# Patient Record
Sex: FEMALE | Race: WHITE | NOT HISPANIC OR LATINO | Employment: FULL TIME | URBAN - METROPOLITAN AREA
[De-identification: names, ages, dates, MRNs, and addresses within clinical notes are randomized per-mention and may not be internally consistent; named-entity substitution may affect disease eponyms.]

---

## 2018-01-30 ENCOUNTER — OFFICE VISIT (OUTPATIENT)
Dept: OBGYN CLINIC | Facility: CLINIC | Age: 53
End: 2018-01-30
Payer: COMMERCIAL

## 2018-01-30 VITALS
HEIGHT: 60 IN | BODY MASS INDEX: 20.22 KG/M2 | DIASTOLIC BLOOD PRESSURE: 62 MMHG | SYSTOLIC BLOOD PRESSURE: 114 MMHG | WEIGHT: 103 LBS

## 2018-01-30 DIAGNOSIS — Z12.31 ENCOUNTER FOR SCREENING MAMMOGRAM FOR MALIGNANT NEOPLASM OF BREAST: ICD-10-CM

## 2018-01-30 DIAGNOSIS — Z01.419 ENCOUNTER FOR GYNECOLOGICAL EXAMINATION WITHOUT ABNORMAL FINDING: ICD-10-CM

## 2018-01-30 DIAGNOSIS — Z12.11 COLON CANCER SCREENING: ICD-10-CM

## 2018-01-30 DIAGNOSIS — Z11.51 SCREENING FOR HPV (HUMAN PAPILLOMAVIRUS): Primary | ICD-10-CM

## 2018-01-30 PROCEDURE — 87624 HPV HI-RISK TYP POOLED RSLT: CPT | Performed by: OBSTETRICS & GYNECOLOGY

## 2018-01-30 PROCEDURE — G0145 SCR C/V CYTO,THINLAYER,RESCR: HCPCS | Performed by: OBSTETRICS & GYNECOLOGY

## 2018-01-30 PROCEDURE — 99386 PREV VISIT NEW AGE 40-64: CPT | Performed by: OBSTETRICS & GYNECOLOGY

## 2018-01-30 NOTE — PROGRESS NOTES
Assessment/Plan:    No problem-specific Assessment & Plan notes found for this encounter  Diagnoses and all orders for this visit:    Screening for HPV (human papillomavirus)  -     Liquid-based pap, screening    Encounter for screening mammogram for malignant neoplasm of breast  -     Mammo screening bilateral w cad    Encounter for gynecological examination without abnormal finding    Colon cancer screening  -     Ambulatory referral to Gastroenterology; Future        Annual examination was completed  Pap with HPV testing was obtained  Mammogram was ordered  We discussed her right lower quadrant discomfort and occasional bulge  This appears consistent with a inguinal hernia  Patient advised to call me if she has persistent pain or mass  We did discuss the use of supportive measures while at work with her lifting  We also discussed potential need for surgical consultation  Patient will continue to observe her night sweats that are improving she will call if she does not continue to see gradual resolution  Subjective:      Patient ID: Yaima Marinelli is a 46 y o  female  Patient presents as a new patient for annual gynecologic visit  She has been menopausal for 2 years  She has no postmenopausal bleeding  She does complain of some night sweats that have decreased in severity  Her medical history is noncontributory  She does complain of occasional bulge and discomfort in her right lower quadrant of her abdomen  She does work as a  at [employer]  Patient does have a family history significant for 2 brothers with colon cancer 1 of which is continuing to have chemotherapeutic treatment  Patient had colonoscopy 2 years ago and was reportedly normal and is on a 5 year routine          The following portions of the patient's history were reviewed and updated as appropriate: allergies, current medications, past family history, past medical history, past social history, past surgical history and problem list     Review of Systems   Constitutional: Negative  Night sweats   HENT: Negative  Eyes: Negative  Respiratory: Negative  Cardiovascular: Negative  Gastrointestinal: Negative  Occasional RLQ bulge w/ discomfort   Endocrine: Negative  Genitourinary: Negative for menstrual problem (normal flow and duration), pelvic pain, vaginal discharge and vaginal pain  Musculoskeletal: Negative  Skin: Negative  Allergic/Immunologic: Negative  Neurological: Negative  Psychiatric/Behavioral: Negative  Objective:     Physical Exam   Constitutional: She is oriented to person, place, and time  She appears well-developed and well-nourished  HENT:   Head: Normocephalic and atraumatic  Nose: Nose normal    Eyes: EOM are normal  Pupils are equal, round, and reactive to light  Neck: Normal range of motion  Neck supple  No thyromegaly present  Cardiovascular: Normal rate and regular rhythm  Pulmonary/Chest: Effort normal and breath sounds normal  No respiratory distress  Breasts no masses, tenderness, skin changes   Abdominal: Soft  Bowel sounds are normal  She exhibits no distension and no mass  There is no tenderness  Hernia confirmed negative in the right inguinal area and confirmed negative in the left inguinal area  No evidence of hernia   Genitourinary: Vagina normal and uterus normal  No breast swelling, tenderness, discharge or bleeding  Pelvic exam was performed with patient supine  No labial fusion  There is no rash, tenderness, lesion or injury on the right labia  There is no rash, tenderness, lesion or injury on the left labia  Cervix exhibits no motion tenderness, no discharge and no friability  Genitourinary Comments: Ext genitalia nl female w/o lesions  Cervix healthy w/o lesions or discharge  uterus nl size, NT, no mass  Adnexa NT, no mass  Rectal no masses   Musculoskeletal: Normal range of motion  She exhibits no edema  Lymphadenopathy:        Right: No inguinal adenopathy present  Left: No inguinal adenopathy present  Neurological: She is alert and oriented to person, place, and time  She has normal reflexes  Skin: Skin is warm and dry  No rash noted  Psychiatric: She has a normal mood and affect  Her behavior is normal  Thought content normal    Nursing note and vitals reviewed

## 2018-02-02 LAB
HPV RRNA GENITAL QL NAA+PROBE: NORMAL
LAB AP GYN PRIMARY INTERPRETATION: NORMAL
Lab: NORMAL

## 2018-02-07 ENCOUNTER — TELEPHONE (OUTPATIENT)
Dept: OBGYN CLINIC | Facility: MEDICAL CENTER | Age: 53
End: 2018-02-07

## 2018-02-07 NOTE — TELEPHONE ENCOUNTER
----- Message from Pastor Kyaw DO sent at 2/2/2018  3:03 PM EST -----  Results within normal limits  Inform pt PAP wnl

## 2019-03-15 ENCOUNTER — TRANSCRIBE ORDERS (OUTPATIENT)
Dept: ADMINISTRATIVE | Facility: HOSPITAL | Age: 54
End: 2019-03-15

## 2019-03-15 ENCOUNTER — HOSPITAL ENCOUNTER (OUTPATIENT)
Dept: RADIOLOGY | Facility: HOSPITAL | Age: 54
Discharge: HOME | End: 2019-03-15
Attending: PODIATRIST
Payer: COMMERCIAL

## 2019-03-15 ENCOUNTER — APPOINTMENT (OUTPATIENT)
Dept: LAB | Facility: HOSPITAL | Age: 54
End: 2019-03-15
Attending: PODIATRIST
Payer: COMMERCIAL

## 2019-03-15 DIAGNOSIS — L60.3 MEDIAN CANALIFORM NAIL DYSTROPHY: ICD-10-CM

## 2019-03-15 DIAGNOSIS — M79.674 PAIN IN TOE OF RIGHT FOOT: Primary | ICD-10-CM

## 2019-03-15 DIAGNOSIS — M79.674 PAIN IN TOE OF RIGHT FOOT: ICD-10-CM

## 2019-03-15 LAB
ALBUMIN SERPL BCP-MCNC: 4.6 G/DL (ref 3–5.2)
ALP SERPL-CCNC: 98 U/L (ref 43–122)
ALT SERPL W P-5'-P-CCNC: 14 U/L (ref 9–52)
AST SERPL W P-5'-P-CCNC: 31 U/L (ref 14–36)
BILIRUB DIRECT SERPL-MCNC: 0 MG/DL
BILIRUB SERPL-MCNC: 0.3 MG/DL
ERYTHROCYTE [DISTWIDTH] IN BLOOD BY AUTOMATED COUNT: 13.3 %
HCT VFR BLD AUTO: 40.8 % (ref 36–46)
HGB BLD-MCNC: 13.4 G/DL (ref 12–16)
MCH RBC QN AUTO: 30.3 PG (ref 26–34)
MCHC RBC AUTO-ENTMCNC: 33 G/DL (ref 31–36)
MCV RBC AUTO: 92 FL (ref 80–100)
PLATELET # BLD AUTO: 265 THOUSANDS/UL (ref 150–450)
PMV BLD AUTO: 8.8 FL (ref 8.9–12.7)
PROT SERPL-MCNC: 7.5 G/DL (ref 5.9–8.4)
RBC # BLD AUTO: 4.44 MILLION/UL (ref 4–5.2)
WBC # BLD AUTO: 6.9 THOUSAND/UL (ref 4.5–11)

## 2019-03-15 PROCEDURE — 85027 COMPLETE CBC AUTOMATED: CPT

## 2019-03-15 PROCEDURE — 80076 HEPATIC FUNCTION PANEL: CPT

## 2019-03-15 PROCEDURE — 36415 COLL VENOUS BLD VENIPUNCTURE: CPT

## 2019-03-15 PROCEDURE — 73630 X-RAY EXAM OF FOOT: CPT

## 2022-07-25 ENCOUNTER — EVALUATION (OUTPATIENT)
Dept: PHYSICAL THERAPY | Facility: CLINIC | Age: 57
End: 2022-07-25
Payer: COMMERCIAL

## 2022-07-25 VITALS — DIASTOLIC BLOOD PRESSURE: 80 MMHG | SYSTOLIC BLOOD PRESSURE: 112 MMHG

## 2022-07-25 DIAGNOSIS — M54.50 ACUTE LEFT-SIDED LOW BACK PAIN WITHOUT SCIATICA: Primary | ICD-10-CM

## 2022-07-25 PROCEDURE — 97161 PT EVAL LOW COMPLEX 20 MIN: CPT

## 2022-07-25 PROCEDURE — 97110 THERAPEUTIC EXERCISES: CPT

## 2022-07-25 NOTE — PROGRESS NOTES
PT Evaluation     Today's date: 2022  Patient name: Batsheva Alaniz  : 1965  MRN: [id number]  Referring provider: Brian Leonardo  Dx:   Encounter Diagnosis     ICD-10-CM    1  Acute left-sided low back pain without sciatica  M54 50        Start Time: 846  Stop Time: 930  Total time in clinic (min): 44 minutes    Assessment  Assessment details: Pt is 60 yo female presenting to OP PT secondary to diagnosis of Low back pain with onset 1 month ago  Pt denies hx of trauma  Pt reports functional limitations as follows discomfort with pushing/pulling activities, pain elevated when waking up in the morning, and intermittently at the end of her work day  Patient demonstrates lumbar AROM deficits, LE strength deficits, and postural deficits in standing/sitting  Pt presents with signs and symptoms consistent with classification of acute low back pain with mobility deficits  She has discomfort with positions promoting back extension, flexion-based program initially progress to extension based program once sx decrease  Pt would benefit from skilled PT to address aforementioned deficits and improve return to prior level of function  Impairments: abnormal or restricted ROM, activity intolerance and pain with function    Symptom irritability: moderateUnderstanding of Dx/Px/POC: good  Goals  Pt will demonstrate [place] with progressive home exercise program to improve carryover and decrease recurrence of symptoms    Pt will demonstrate 20% improvement in FOTO score to increase tolerance to functional activities   Pt will demonstrate 10-20 deg improvement in lumbar AROM to increase tolerance to reaching for items off the floor and improve ease with turning  Pt will demonstrate tolerance to pushing/pulling medium weights (up to 15Ibs) with min to no discomfort to improve ease with return to work duties  Pt will demonstrate tolerance to maintain proper posture (decrease in forward flexion) when amb/standing without sx elevation to improve ease with  Standing/walking during work up to 8 hours  Pt will demonstrate 5/5 in LE strength to allow for improved tolerance to prolonged amb/standing      Plan  Patient would benefit from: PT eval and skilled physical therapy  Planned modality interventions: TENS, low level laser therapy, cryotherapy, ultrasound, traction and thermotherapy: hydrocollator packs  Planned therapy interventions: manual therapy, joint mobilization, neuromuscular re-education, patient education, therapeutic activities, therapeutic exercise, home exercise program, Calle taping, balance/weight bearing training, gait training, balance and stretching  Frequency: 2x week  Duration in visits: 12  Duration in weeks: 6  Plan of Care beginning date: 2022  Plan of Care expiration date: 2022  Treatment plan discussed with: patient        Subjective Evaluation    History of Present Illness  Mechanism of injury: Autumn Cain is a 62 yo female presenting to OP PT secondary to left-sided low back pain with insidious onset 1 month ago  She reports pain is primarily aggravated in the morning and lasts throughout the day  She reports she manages symptoms with pain medication, primarily ibuprofen  Pt reports increase in pain with pulling or pushing movements  She denies numbness/tingling or hx of trauma  Pt reports she has had few episodes of pain in the past week while on vacation  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             Recurrent probem    Quality of life: excellent    Pain  Current pain ratin  At best pain ratin  At worst pain rating: 10  Location: Left side of low back  Quality: dull ache and throbbing  Relieving factors: rest and medications  Aggravating factors: standing and walking  Progression: no change    Social Support  Steps to enter house: no  Stairs in house: no   Lives in: [facility]  Lives with: significant other    Employment status: working  Hand dominance: left  Exercise history: Sedentary   Life stress: normal      Diagnostic Tests  X-ray: normal  Treatments  Previous treatment: medication  Current treatment: medication  Patient Goals  Patient goals for therapy: decreased pain and increased strength          Objective     Concurrent Complaints  Negative for night pain, disturbed sleep, bladder dysfunction, bowel dysfunction and saddle (S4) numbness    Active Range of Motion     Lumbar   Flexion:  WFL  Extension: 10 degrees  with pain  Left lateral flexion: 10 degrees    with pain  Right lateral flexion: 15 degrees  with pain  Left rotation:  Restriction level: minimal  Right rotation:  Restriction level: minimal    Strength/Myotome Testing     Lumbar   Left   Heel walk: normal  Toe walk: normal    Right   Heel walk: normal  Toe walk: normal    Left Hip   Planes of Motion   Flexion: 4  External rotation: 4  Internal rotation: 4    Right Hip   Planes of Motion   Flexion: 4  External rotation: 4  Internal rotation: 4    Left Knee   Flexion: 4  Extension: 4+    Right Knee   Flexion: 4  Extension: 4+    Left Ankle/Foot   Dorsiflexion: 4  Plantar flexion: 4+  Great toe extension: 4    Right Ankle/Foot   Dorsiflexion: 4  Plantar flexion: 4+  Great toe extension: 4    Tests     Lumbar     Left   Negative crossed SLR, passive SLR and slump test      Right   Negative crossed SLR, passive SLR and slump test      General Comments:    Lower quarter screen   Hips: unremarkable  Knees: unremarkable  Foot/ankle: unremarkable      Flowsheet Rows    Flowsheet Row Most Recent Value   PT/OT G-Codes    Current Score 44   Projected Score 68             Precautions: none      Manuals                                                                 Neuro Re-Ed                                                                                                        Ther Ex             LTR 10x5''            SKC 10x5''             TA holds, with [employer] [place] sets Bridges 5''x20            Mini squats             Standing SLR             Bridges             DKC with Pball             Ther Activity             Nustep             Give HEP NV            Gait Training                                       Modalities

## 2022-07-25 NOTE — LETTER
2022    Nikole Turner  [address]  [place] [address] [facility]    Patient: Reji Montoya   YOB: 1965   Date of Visit: 2022     Encounter Diagnosis     ICD-10-CM    1  Acute left-sided low back pain without sciatica  M54 50        Dear Dr Geovanni Madden:    Thank you for your recent referral of Reji Montoya  Please review the attached evaluation summary from Bianka's recent visit  Please verify that you agree with the plan of care by signing the attached order  If you have any questions or concerns, please do not hesitate to call  I sincerely appreciate the opportunity to share in the care of one of your patients and hope to have another opportunity to work with you in the near future  Sincerely,    Roger Boswell, PT      Referring Provider:      I certify that I have read the below Plan of Care and certify the need for these services furnished under this plan of treatment while under my care  [address]  Via Fax: [phone]          PT Evaluation     Today's date: 2022  Patient name: Reji Montoya  : 1965  MRN: [id number]  Referring provider: Cassandra Simpson  Dx:   Encounter Diagnosis     ICD-10-CM    1  Acute left-sided low back pain without sciatica  M54 50        Start Time: 08  Stop Time: 930  Total time in clinic (min): 44 minutes    Assessment  Assessment details: Pt is 60 yo female presenting to OP PT secondary to diagnosis of Low back pain with onset 1 month ago  Pt denies hx of trauma  Pt reports functional limitations as follows discomfort with pushing/pulling activities, pain elevated when waking up in the morning, and intermittently at the end of her work day  Patient demonstrates lumbar AROM deficits, LE strength deficits, and postural deficits in standing/sitting  Pt presents with signs and symptoms consistent with classification of acute low back pain with mobility deficits   She has discomfort with positions promoting back extension, flexion-based program initially progress to extension based program once sx decrease  Pt would benefit from skilled PT to address aforementioned deficits and improve return to prior level of function  Impairments: abnormal or restricted ROM, activity intolerance and pain with function    Symptom irritability: moderateUnderstanding of Dx/Px/POC: good  Goals  Pt will demonstrate [place] with progressive home exercise program to improve carryover and decrease recurrence of symptoms  Pt will demonstrate 20% improvement in FOTO score to increase tolerance to functional activities   Pt will demonstrate 10-20 deg improvement in lumbar AROM to increase tolerance to reaching for items off the floor and improve ease with turning  Pt will demonstrate tolerance to pushing/pulling medium weights (up to 15Ibs) with min to no discomfort to improve ease with return to work duties  Pt will demonstrate tolerance to maintain proper posture (decrease in forward flexion) when amb/standing without sx elevation to improve ease with  Standing/walking during work up to 8 hours     Pt will demonstrate 5/5 in LE strength to allow for improved tolerance to prolonged amb/standing      Plan  Patient would benefit from: PT eval and skilled physical therapy  Planned modality interventions: TENS, low level laser therapy, cryotherapy, ultrasound, traction and thermotherapy: hydrocollator packs  Planned therapy interventions: manual therapy, joint mobilization, neuromuscular re-education, patient education, therapeutic activities, therapeutic exercise, home exercise program, Calle taping, balance/weight bearing training, gait training, balance and stretching  Frequency: 2x week  Duration in visits: 12  Duration in weeks: 6  Plan of Care beginning date: 7/25/2022  Plan of Care expiration date: 9/19/2022  Treatment plan discussed with: patient        Subjective Evaluation    History of Present Illness  Mechanism of injury: Chely Pardo is a 62 yo female presenting to OP PT secondary to left-sided low back pain with insidious onset 1 month ago  She reports pain is primarily aggravated in the morning and lasts throughout the day  She reports she manages symptoms with pain medication, primarily ibuprofen  Pt reports increase in pain with pulling or pushing movements  She denies numbness/tingling or hx of trauma  Pt reports she has had few episodes of pain in the past week while on vacation  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             Recurrent probem    Quality of life: excellent    Pain  Current pain ratin  At best pain ratin  At worst pain rating: 10  Location: Left side of low back  Quality: dull ache and throbbing  Relieving factors: rest and medications  Aggravating factors: standing and walking  Progression: no change    Social Support  Steps to enter house: no  Stairs in house: no   Lives in: [facility]  Lives with: significant other    Employment status: working  Hand dominance: left  Exercise history: Sedentary   Life stress: normal      Diagnostic Tests  X-ray: normal  Treatments  Previous treatment: medication  Current treatment: medication  Patient Goals  Patient goals for therapy: decreased pain and increased strength          Objective     Concurrent Complaints  Negative for night pain, disturbed sleep, bladder dysfunction, bowel dysfunction and saddle (S4) numbness    Active Range of Motion     Lumbar   Flexion:  WFL  Extension: 10 degrees  with pain  Left lateral flexion: 10 degrees    with pain  Right lateral flexion: 15 degrees  with pain  Left rotation:  Restriction level: minimal  Right rotation:  Restriction level: minimal    Strength/Myotome Testing     Lumbar   Left   Heel walk: normal  Toe walk: normal    Right   Heel walk: normal  Toe walk: normal    Left Hip   Planes of Motion   Flexion: 4  External rotation: 4  Internal rotation: 4    Right Hip   Planes of Motion   Flexion: 4  External rotation: 4  Internal rotation: 4    Left Knee   Flexion: 4  Extension: 4+    Right Knee   Flexion: 4  Extension: 4+    Left Ankle/Foot   Dorsiflexion: 4  Plantar flexion: 4+  Great toe extension: 4    Right Ankle/Foot   Dorsiflexion: 4  Plantar flexion: 4+  Great toe extension: 4    Tests     Lumbar     Left   Negative crossed SLR, passive SLR and slump test      Right   Negative crossed SLR, passive SLR and slump test      General Comments:    Lower quarter screen   Hips: unremarkable  Knees: unremarkable  Foot/ankle: unremarkable      Flowsheet Rows    Flowsheet Row Most Recent Value   PT/OT G-Codes    Current Score 44   Projected Score 68             Precautions: none      Manuals                                                                 Neuro Re-Ed                                                                                                        Ther Ex             LTR 10x5''            SKC 10x5''             TA holds, with [place]             [place]             glute sets [employer] 5''x20            Mini squats             Standing SLR             Bridges             DKC with Pball             Ther Activity             Nustep             Give HEP NV            Gait Training                                       Modalities

## 2022-08-01 ENCOUNTER — OFFICE VISIT (OUTPATIENT)
Dept: PHYSICAL THERAPY | Facility: CLINIC | Age: 57
End: 2022-08-01
Payer: COMMERCIAL

## 2022-08-01 DIAGNOSIS — M54.50 ACUTE LEFT-SIDED LOW BACK PAIN WITHOUT SCIATICA: Primary | ICD-10-CM

## 2022-08-01 PROCEDURE — 97140 MANUAL THERAPY 1/> REGIONS: CPT

## 2022-08-01 PROCEDURE — 97112 NEUROMUSCULAR REEDUCATION: CPT

## 2022-08-01 PROCEDURE — 97110 THERAPEUTIC EXERCISES: CPT

## 2022-08-01 PROCEDURE — 97530 THERAPEUTIC ACTIVITIES: CPT

## 2022-08-01 NOTE — PROGRESS NOTES
Daily Note     Today's date: 2022  Patient name: Carrie Fermin  : 1965  MRN: [id number]  Referring provider: Rika John  Dx:   Encounter Diagnosis     ICD-10-CM    1  Acute left-sided low back pain without sciatica  M54 50                   Subjective: Patient reports having some morning pain but it's a better today  Her biggest struggle is at work  She's on her feet a lot- she starts at 630 and has a lot of pain by 830  Objective: See treatment diary below      Assessment: Patient did well with treatment today  Initiated supine core stability exercises which patient was able to complete with fair activation  Cueing required to maintain steady breathing during exercises  Plan to focus on mechanics with lifting/pulling/pushing as well as progressing core stability and hip strengthening  Plan: Continue per plan of care        Precautions: none    1:1 with -7  Manuals            STM  paraspinals                                                   Neuro Re-Ed             TA   10x5"           TA + march  x20           TA + leg ext  x10           Dead bug             Bird dog                                        Ther Ex             LTR 10x5'' 10x5"            SKC 10x5''  3x           Clamshells             glute sets Bridges 5''x20 2x10           Mini squats             Standing SLR             DKC with Pball             Bike  5 min           Lumbar rolls  x10 ea dir            R SL open book stretch  x10                        Ther Activity             [place] walkbacks  15# x10           Give HEP NV            Sit to stands   3x10 to chair                         Gait Training                                       Modalities

## 2022-08-04 ENCOUNTER — OFFICE VISIT (OUTPATIENT)
Dept: PHYSICAL THERAPY | Facility: CLINIC | Age: 57
End: 2022-08-04
Payer: COMMERCIAL

## 2022-08-04 DIAGNOSIS — M54.50 ACUTE LEFT-SIDED LOW BACK PAIN WITHOUT SCIATICA: Primary | ICD-10-CM

## 2022-08-04 PROCEDURE — 97110 THERAPEUTIC EXERCISES: CPT

## 2022-08-04 PROCEDURE — 97530 THERAPEUTIC ACTIVITIES: CPT

## 2022-08-04 PROCEDURE — 97140 MANUAL THERAPY 1/> REGIONS: CPT

## 2022-08-04 PROCEDURE — 97112 NEUROMUSCULAR REEDUCATION: CPT

## 2022-08-04 NOTE — PROGRESS NOTES
Daily Note     Today's date: 2022  Patient name: Kodi De La Rosa  : 1965  MRN: [id number]  Referring provider: Mandie Roe  Dx:   Encounter Diagnosis     ICD-10-CM    1  Acute left-sided low back pain without sciatica  M54 50                   Subjective: Patient reports that she has some soreness this morning but felt good the last two days       Objective: See treatment diary below      Assessment: Focused on thoracic mobility today as she has significant TTP to mid thoracic spine on the L that radiates down to her low back  Pt had positive response to manual therapy and thoracic mobility exercises  Continued to focus on TA stability as well  Plan next visit to add in more push/pull motions as pt returns back to work  Plan: Continue per plan of care        Precautions: none    1:1 with PT [id number]  Manuals           STM  paraspinals  BERNABE           PA mobilizations thoracic spine   BERNABE GIII                                     Neuro Re-Ed             TA   10x5"           TA + march  x20           TA + leg ext  x10           Dead bug             Bird dog              TA + straight arm pull down   RTB 3x10                       Ther Ex             LTR 10x5'' 10x5"            SKC 10x5''  3x           Clamshells             glute sets Bridges 5''x20 2x10           Mini squats             Standing SLR             DKC with Pball             Bike  5 min 5 min          Lumbar rolls  x10 ea dir  x5 ea dir           Open book stretch  x10 10x          Prone press up - thoracic   10x5"           1/2 foam thoracic extensions   5x5" ext + diag           Rows   RTB 3x10          Cross body door stretch    3x30"                                     Ther Activity             Sanjiv walkbacks  15# x10  NV         Give HEP NV            Sit to stands   3x10 to chair            Chops   RTB x15 B           Sanjiv walk fwds    NV                      Gait Training Modalities

## 2022-08-08 ENCOUNTER — APPOINTMENT (OUTPATIENT)
Dept: PHYSICAL THERAPY | Facility: CLINIC | Age: 57
End: 2022-08-08
Payer: COMMERCIAL

## 2022-08-11 ENCOUNTER — APPOINTMENT (OUTPATIENT)
Dept: PHYSICAL THERAPY | Facility: CLINIC | Age: 57
End: 2022-08-11
Payer: COMMERCIAL

## 2022-08-15 ENCOUNTER — OFFICE VISIT (OUTPATIENT)
Dept: PHYSICAL THERAPY | Facility: CLINIC | Age: 57
End: 2022-08-15
Payer: COMMERCIAL

## 2022-08-15 DIAGNOSIS — M54.50 ACUTE LEFT-SIDED LOW BACK PAIN WITHOUT SCIATICA: Primary | ICD-10-CM

## 2022-08-15 PROCEDURE — 97140 MANUAL THERAPY 1/> REGIONS: CPT

## 2022-08-15 PROCEDURE — 97112 NEUROMUSCULAR REEDUCATION: CPT

## 2022-08-15 PROCEDURE — 97110 THERAPEUTIC EXERCISES: CPT

## 2022-08-15 NOTE — PROGRESS NOTES
Daily Note     Today's date: 8/15/2022  Patient name: Krysta Frye  : 1965  MRN: [id number]  Referring provider: Alexander Verma  Dx:   Encounter Diagnosis     ICD-10-CM    1  Acute left-sided low back pain without sciatica  M54 50                   Subjective: Pt reports that she worked two days last week before she got sick and her back was okay; today she is in a lot of pain though       Objective: See treatment diary below      Assessment: Patient with less pain at the end of the session today  Continued to focus on TA stability as well as thoracic mobility  Pt with significant tenderness to palpation of T10-12 which decreased at the end of the session  Progress next visit to dead bugs and more challenging core exercises  Plan: Continue per plan of care        Precautions: none    1:1 with -457  Manuals 7/25 8/1 8/4 8/15         STM  paraspinals  BERNABE  BERNABE         PA mobilizations thoracic spine   BERNABE GIII                                     Neuro Re-Ed             TA   10x5"  10x5"         TA + march  x20  20x         TA + leg ext  x10           Dead bug             Bird dog              TA + straight arm pull down   RTB 3x10                       Ther Ex             LTR 10x5'' 10x5"   10x         SKC 10x5''  3x  3x         Clamshells             glute sets Bridges 5''x20 2x10  3x10         Mini squats             Standing SLR             DKC with Pball             Bike  5 min 5 min 5 min          Lumbar rolls  x10 ea dir  x5 ea dir  5x ea dir         Open book stretch  x10 10x 10x         Prone press up - thoracic   10x5"           1/2 foam thoracic extensions   5x5" ext + diag           Rows   RTB 3x10          Cross body door stretch    3x30"           Seated thoracic Sanjiv rotation    9# 10x         Hip abd    2x10         Hip ext    2x10                      Ther Activity             [place] walkbacks  15# x10  11# 10x         Give HEP NV            Sit to stands   3x10 to chair [place]   RTB x15 B           [place] walk fwds    8# 19x                      Gait Training                                       Modalities

## 2022-08-18 ENCOUNTER — OFFICE VISIT (OUTPATIENT)
Dept: PHYSICAL THERAPY | Facility: CLINIC | Age: 57
End: 2022-08-18
Payer: COMMERCIAL

## 2022-08-18 DIAGNOSIS — M54.50 ACUTE LEFT-SIDED LOW BACK PAIN WITHOUT SCIATICA: Primary | ICD-10-CM

## 2022-08-18 PROCEDURE — 97110 THERAPEUTIC EXERCISES: CPT

## 2022-08-18 PROCEDURE — 97530 THERAPEUTIC ACTIVITIES: CPT

## 2022-08-18 PROCEDURE — 97112 NEUROMUSCULAR REEDUCATION: CPT

## 2022-08-18 NOTE — PROGRESS NOTES
Daily Note     Today's date: 2022  Patient name: Austin Hunter  : 1965  MRN: [id number]  Referring provider: Deanne Deal  Dx:   Encounter Diagnosis     ICD-10-CM    1  Acute left-sided low back pain without sciatica  M54 50                   Subjective: Patient reports that she had no pain at work yesterday and feels great today       Objective: See treatment diary below      Assessment: Pt had increased difficulty with Sanjiv lateral step outs to the L side vs the right side  Tolerated all other exercises well and responded positively to new exercises given  Continue per plan of care as tolerated and can progress as tolerated next visit  Plan: Continue per plan of care        Precautions: none    1:1 with -5  ** only bill 2-3 units  Manuals 7/25 8/1 8/4 8/15 8/18        STM  paraspinals  BERNABE  BERNABE         PA mobilizations thoracic spine   BERNABE GIII                                     Neuro Re-Ed             TA   10x5"  10x5"         TA + march  x20  20x 20x        TA + leg ext  x10   10x        Dead bug             Bird dog              TA + straight arm pull down   RTB 3x10                       Ther Ex             LTR 10x5'' 10x5"   10x 10x        SKC 10x5''  3x  3x         Clamshells             glute sets [place] 5''x20 2x10  3x10 3x10        Mini squats             Standing SLR             DKC with Pball             Bike  5 min 5 min 5 min  5 min         Lumbar rolls  x10 ea dir  x5 ea dir  5x ea dir 5x ea dir        Open book stretch  x10 10x 10x         Prone press up - thoracic   10x5"           1/2 foam thoracic extensions   5x5" ext + diag           Rows   RTB 3x10          Cross body door stretch    3x30"           Seated thoracic Sanjiv rotation    9# 10x         Hip abd    2x10 3x10        Hip ext    2x10 3x10                     Ther Activity             Sanj[place] walkbacks  15# x10  11# 10x 15# x10        Give HEP NV            Sit to stands   3x10 to chair            Chops   RTB x15 B           [place] walk fwds    8# 10x 13# 10x        Sanjiv lateral      7# 10x ea side        Suitcase carries     15# 4 laps alt hands                                   Gait Training                                       Modalities

## 2022-08-22 ENCOUNTER — OFFICE VISIT (OUTPATIENT)
Dept: PHYSICAL THERAPY | Facility: CLINIC | Age: 57
End: 2022-08-22
Payer: COMMERCIAL

## 2022-08-22 DIAGNOSIS — M54.50 ACUTE LEFT-SIDED LOW BACK PAIN WITHOUT SCIATICA: Primary | ICD-10-CM

## 2022-08-22 PROCEDURE — 97110 THERAPEUTIC EXERCISES: CPT

## 2022-08-22 PROCEDURE — 97112 NEUROMUSCULAR REEDUCATION: CPT

## 2022-08-22 NOTE — PROGRESS NOTES
Daily Note     Today's date: 2022  Patient name: Herminio Padilla  : 1965  MRN: [id number]  Referring provider: Jam Ann  Dx:   Encounter Diagnosis     ICD-10-CM    1  Acute left-sided low back pain without sciatica  M54 50                   Subjective: Pt states she has some low back soreness today for no specific reason, but was feeling good yesterday  Objective: See treatment diary below      Assessment: Tolerated treatment well  Patient demonstrated fatigue post treatment, exhibited good technique with therapeutic exercises and would benefit from continued PT  STM this date secondary to subjective reports upon arrival  [place] resisted walking held due to time/availability  Notes some relief with STM  Plan: Continue per plan of care  Progress treatment as tolerated         Precautions: none    1:1 with -5  ** only bill 2-3 units  Manuals 7/25 8/1 8/4 8/15 8/18 8/22       STM  paraspinals  BERNABE  BERNABE  MS       PA mobilizations thoracic spine   BERNABE GIII                                     Neuro Re-Ed             TA   10x5"  10x5"         TA + march  x20  20x 20x x20       TA + leg ext  x10   10x        Dead bug             Bird dog              TA + straight arm pull down   RTB 3x10                       Ther Ex             LTR 10x5'' 10x5"   10x 10x x10       SKC 10x5''  3x  3x         Clamshells             glute sets [place] 5''x20 2x10  3x10 3x10 3x10       Mini squats             Standing SLR             DKC with Pball             Bike  5 min 5 min 5 min  5 min  5'       Lumbar rolls  x10 ea dir  x5 ea dir  5x ea dir 5x ea dir x5 ea dir       Open book stretch  x10 10x 10x         Prone press up - thoracic   10x5"           1/2 foam thoracic extensions   5x5" ext + diag           Rows   RTB 3x10          Cross body door stretch    3x30"           Seated thoracic Sanjiv rotation    9# 10x         Hip abd    2x10 3x10 3x10       Hip ext    2x10 3x10 3x10                    Ther Activity             Sanj[place] walkbacks  15# x10  11# 10x 15# x10 nv       Give HEP NV            Sit to stands   3x10 to chair            Chops   RTB x15 B           [place] walk fwds    8# 10x 13# 10x nv       [place] lateral      7# 10x ea side nv       Suitcase carries     15# 4 laps alt hands  15# x4 laps alt hands                                 Gait Training                                       Modalities

## 2022-08-25 ENCOUNTER — OFFICE VISIT (OUTPATIENT)
Dept: PHYSICAL THERAPY | Facility: CLINIC | Age: 57
End: 2022-08-25
Payer: COMMERCIAL

## 2022-08-25 DIAGNOSIS — M54.50 ACUTE LEFT-SIDED LOW BACK PAIN WITHOUT SCIATICA: Primary | ICD-10-CM

## 2022-08-25 PROCEDURE — 97530 THERAPEUTIC ACTIVITIES: CPT

## 2022-08-25 PROCEDURE — 97110 THERAPEUTIC EXERCISES: CPT

## 2022-08-25 NOTE — PROGRESS NOTES
Daily Note     Today's date: 2022  Patient name: Genna Copeland  : 1965  MRN: [id number]  Referring provider: Sergio Truong  Dx:   Encounter Diagnosis     ICD-10-CM    1  Acute left-sided low back pain without sciatica  M54 50                   Subjective: Patient reports continued improvements with her back; she's had no pain today        Objective: See treatment diary below      Assessment: Patient continues to make great progress with activities  Able to perform all exercises today without discomfort  Reincorporated chops and multi-direction exercises into treatment plan which patient required minimal cueing for  Incorporated lifting from the foot stool- cueing required for hip dominant position and to decrease uncontrolled knee valgus  Pt improved with repetitions  Plan: Continue per plan of care        Precautions: none    1:1 with -5   ** only bill 2-3 units  RE   Manuals 7/25 8/1 8/4 8/15 8/18 8/22 8/25      STM  paraspinals  Rae Arzola  MS       PA mobilizations thoracic spine   BERNABE GIII                                     Neuro Re-Ed             TA   10x5"  10x5"         TA + march  x20  20x 20x x20       TA + leg ext  x10   10x        Dead bug       10x ea side       Bird dog              TA + straight arm pull down   RTB 3x10                       Ther Ex             LTR 10x5'' 10x5"   10x 10x x10 x10      SKC 10x5''  3x  3x   3x       Clamshells             glute sets [place] 5''x20 2x10  3x10 3x10 3x10 10x regular, 10x leg ext      Mini squats             Standing SLR             DKC with Pball             Bike  5 min 5 min 5 min  5 min  5'       Lumbar rolls  x10 ea dir  x5 ea dir  5x ea dir 5x ea dir x5 ea dir 5x ea dir       Open book stretch  x10 10x 10x         Prone press up - thoracic   10x5"           1/2 foam thoracic extensions   5x5" ext + diag           Rows   RTB 3x10          Cross body door stretch    3x30"           Seated thoracic Sanjiv rotation    9# 10x Hip abd    2x10 3x10 3x10       Hip ext    2x10 3x10 3x10       Treadmill       5'       Seated lumbar flexion folds       10x                                Ther Activity             [place] walkbacks  15# x10  11# 10x 15# x10 nv 17# 10x      Give HEP NV            Sit to stands   3x10 to chair            Chops   RTB x15 B     9# 10x      Sanjiv walk fwds    8# 10x 13# 10x nv 15# 10x      [place] lateral      7# 10x ea side nv 7# 10x ea side       Suitcase carries     15# 4 laps alt hands  15# x4 laps alt hands 20# x4 laps alt hands       Lifting kinematics        From step with box 4'                    Gait Training                                       Modalities

## 2022-09-01 ENCOUNTER — EVALUATION (OUTPATIENT)
Dept: PHYSICAL THERAPY | Facility: CLINIC | Age: 57
End: 2022-09-01
Payer: COMMERCIAL

## 2022-09-01 DIAGNOSIS — M54.50 ACUTE LEFT-SIDED LOW BACK PAIN WITHOUT SCIATICA: Primary | ICD-10-CM

## 2022-09-01 PROCEDURE — 97530 THERAPEUTIC ACTIVITIES: CPT

## 2022-09-01 PROCEDURE — 97110 THERAPEUTIC EXERCISES: CPT

## 2022-09-01 PROCEDURE — 97140 MANUAL THERAPY 1/> REGIONS: CPT

## 2022-09-01 NOTE — LETTER
2022    Lisa Kendrick  [address]  [place] [address] [facility]    Patient: Chely De Los Santos   YOB: 1965   Date of Visit: 2022     Encounter Diagnosis     ICD-10-CM    1  Acute left-sided low back pain without sciatica  M54 50        Dear Dr Grabiel Peña:    Thank you for your recent referral of Chely De Los Santos  Please review the attached evaluation summary from Bianka's recent visit  Please verify that you agree with the plan of care by signing the attached order  If you have any questions or concerns, please do not hesitate to call  I sincerely appreciate the opportunity to share in the care of one of your patients and hope to have another opportunity to work with you in the near future  Sincerely,    Dorethia Burkitt, PT      Referring Provider:      I certify that I have read the below Plan of Care and certify the need for these services furnished under this plan of treatment while under my care  [id number] [employer]  Via Fax: [phone]          Daily Note     Today's date: 2022  Patient name: Chely De Los Santos  : 1965  MRN: [id number]  Referring provider: Sue Fisher  Dx:   Encounter Diagnosis     ICD-10-CM    1  Acute left-sided low back pain without sciatica  M54 50                   Subjective: Pt reports that she's been feeling pretty good  She feels % of desired level of function  Goals  Pt will demonstrate [place] with progressive home exercise program to improve carryover and decrease recurrence of symptoms   -MET  Pt will demonstrate 20% improvement in FOTO score to increase tolerance to functional activities -MET  Pt will demonstrate 10-20 deg improvement in lumbar AROM to increase tolerance to reaching for items off the floor and improve ease with turning -MET   Pt will demonstrate tolerance to pushing/pulling medium weights (up to 15Ibs) with min to no discomfort to improve ease with return to work duties -MET   Pt will demonstrate tolerance to maintain proper posture (decrease in forward flexion) when amb/standing without sx elevation to improve ease with  Standing/walking during work up to 8 hours  -MET   Pt will demonstrate 5/5 in LE strength to allow for improved tolerance to prolonged amb/standing -MET     Objective:       Concurrent Complaints  Negative for night pain, disturbed sleep, bladder dysfunction, bowel dysfunction and saddle (S4) numbness    Active Range of Motion     Lumbar   Flexion:  WFL  Extension: WFL  Left lateral flexion: WFL  Right lateral flexion: WFL  Left rotation:  Restriction level: minimal  Right rotation:  Restriction level: minimal    Strength/Myotome Testing     Lumbar   Left   Heel walk: normal  Toe walk: normal    Right   Heel walk: normal  Toe walk: normal    Left Hip   Planes of Motion   Flexion: 4+  External rotation: 4+  Internal rotation: 5    Right Hip   Planes of Motion   Flexion: 4+  External rotation: 4+  Internal rotation: 5    Left Knee   Flexion: 5  Extension: 5    Right Knee   Flexion: 5  Extension: 5    Left Ankle/Foot   Dorsiflexion: 5  Plantar flexion: 5  Great toe extension: 5    Right Ankle/Foot   Dorsiflexion: 5  Plantar flexion: 5  Great toe extension: 5    Tests     Lumbar     Left   Negative crossed SLR, passive SLR and slump test      Right   Negative crossed SLR, passive SLR and slump test      General Comments:    Lower quarter screen   Hips: unremarkable  Knees: unremarkable  Foot/ankle: unremarkable      Assessment: 9/1: Jose Mason reports a perceived improvement of 90%  Functional status measure improved from 43 to 87  Patient notes pain has reduced to 0-1/10  Overall, patient has made steady progress toward goals but is still fearful and hesitant of work-related activities  She is required to hold 12-15# pieces of equipment at a time to work on it and would like to spend a few PT sessions trying to improve work-related tasks         Initial eval: Pt is 60 yo female presenting to OP PT secondary to diagnosis of Low back pain with onset 1 month ago  Pt denies hx of trauma  Pt reports functional limitations as follows discomfort with pushing/pulling activities, pain elevated when waking up in the morning, and intermittently at the end of her work day  Patient demonstrates lumbar AROM deficits, LE strength deficits, and postural deficits in standing/sitting  Pt presents with signs and symptoms consistent with classification of acute low back pain with mobility deficits  She has discomfort with positions promoting back extension, flexion-based program initially progress to extension based program once sx decrease  Pt would benefit from skilled PT to address aforementioned deficits and improve return to prior level of function  Plan: Continue per plan of care        Precautions: none    1:1 with -530  ** only bill 2-3 units  RE 10/1  Manuals 7/25 8/1 8/4 8/15 8/18 8/22 8/25 9/1     STM  paraspinals  Ryan Rashid  MS       PA mobilizations thoracic spine   BERNABE GIII           Assessment        BERNABE                   Neuro Re-Ed             TA   10x5"  10x5"         TA + march  x20  20x 20x x20       TA + leg ext  x10   10x        Dead bug       10x ea side       Bird dog              TA + straight arm pull down   RTB 3x10          Bent over row         12# 2x10 B                               Ther Ex             LTR 10x5'' 10x5"   10x 10x x10 x10      SKC 10x5''  3x  3x   3x       Clamshells             glute sets [place] 5''x20 2x10  3x10 3x10 3x10 10x regular, 10x leg ext      Mini squats             Standing SLR             DKC with Pball             Bike  5 min 5 min 5 min  5 min  5'  6'     Lumbar rolls  x10 ea dir  x5 ea dir  5x ea dir 5x ea dir x5 ea dir 5x ea dir  10x ea dir      Open book stretch  x10 10x 10x         Prone press up - thoracic   10x5"           1/2 foam thoracic extensions   5x5" ext + diag           Rows   RTB 3x10          Cross body door stretch    3x30"           Seated thoracic Sanjiv rotation    9# 10x         Hip abd    2x10 3x10 3x10       Hip ext    2x10 3x10 3x10       Treadmill       5'       Seated lumbar flexion folds       10x      Bicep curls         10# 2x10 alt hands                  Ther Activity             [place] walkbacks  15# x10  11# 10x 15# x10 nv  20# 10x     Give HEP NV            Sit to stands   3x10 to chair            Chops   RTB x15 B     9# 10x      [place] walk fwds    8# 10x 13# 10x nv 15# 10x      [place] lateral      7# 10x ea side nv 7# 10x ea side       Suitcase carries     15# 4 laps alt hands  15# x4 laps alt hands 20# x4 laps alt hands  20# x6 lengths      Lifting kinematics        From step with box 4'       Picking up weight from chair to table         12# 6x      Gait Training                                       Modalities

## 2022-09-01 NOTE — PROGRESS NOTES
Daily Note     Today's date: 2022  Patient name: Venus Dennis  : 1965  MRN: [id number]  Referring provider: Yury Schwab  Dx:   Encounter Diagnosis     ICD-10-CM    1  Acute left-sided low back pain without sciatica  M54 50                   Subjective: Pt reports that she's been feeling pretty good  She feels % of desired level of function  Goals  Pt will demonstrate [place] with progressive home exercise program to improve carryover and decrease recurrence of symptoms  -MET  Pt will demonstrate 20% improvement in FOTO score to increase tolerance to functional activities -MET  Pt will demonstrate 10-20 deg improvement in lumbar AROM to increase tolerance to reaching for items off the floor and improve ease with turning -MET   Pt will demonstrate tolerance to pushing/pulling medium weights (up to 15Ibs) with min to no discomfort to improve ease with return to work duties -MET   Pt will demonstrate tolerance to maintain proper posture (decrease in forward flexion) when amb/standing without sx elevation to improve ease with  Standing/walking during work up to 8 hours   -MET   Pt will demonstrate 5/5 in LE strength to allow for improved tolerance to prolonged amb/standing -MET     Objective:       Concurrent Complaints  Negative for night pain, disturbed sleep, bladder dysfunction, bowel dysfunction and saddle (S4) numbness    Active Range of Motion     Lumbar   Flexion:  WFL  Extension: WFL  Left lateral flexion: WFL  Right lateral flexion: WFL  Left rotation:  Restriction level: minimal  Right rotation:  Restriction level: minimal    Strength/Myotome Testing     Lumbar   Left   Heel walk: normal  Toe walk: normal    Right   Heel walk: normal  Toe walk: normal    Left Hip   Planes of Motion   Flexion: 4+  External rotation: 4+  Internal rotation: 5    Right Hip   Planes of Motion   Flexion: 4+  External rotation: 4+  Internal rotation: 5    Left Knee   Flexion: 5  Extension: 5    Right Knee   Flexion: 5  Extension: 5    Left Ankle/Foot   Dorsiflexion: 5  Plantar flexion: 5  Great toe extension: 5    Right Ankle/Foot   Dorsiflexion: 5  Plantar flexion: 5  Great toe extension: 5    Tests     Lumbar     Left   Negative crossed SLR, passive SLR and slump test      Right   Negative crossed SLR, passive SLR and slump test      General Comments:    Lower quarter screen   Hips: unremarkable  Knees: unremarkable  Foot/ankle: unremarkable      Assessment: 9/1: Nora Harvey reports a perceived improvement of 90%  Functional status measure improved from 43 to 87  Patient notes pain has reduced to 0-1/10  Overall, patient has made steady progress toward goals but is still fearful and hesitant of work-related activities  She is required to hold 12-15# pieces of equipment at a time to work on it and would like to spend a few PT sessions trying to improve work-related tasks  Initial eval: Pt is 60 yo female presenting to OP PT secondary to diagnosis of Low back pain with onset 1 month ago  Pt denies hx of trauma  Pt reports functional limitations as follows discomfort with pushing/pulling activities, pain elevated when waking up in the morning, and intermittently at the end of her work day  Patient demonstrates lumbar AROM deficits, LE strength deficits, and postural deficits in standing/sitting  Pt presents with signs and symptoms consistent with classification of acute low back pain with mobility deficits  She has discomfort with positions promoting back extension, flexion-based program initially progress to extension based program once sx decrease  Pt would benefit from skilled PT to address aforementioned deficits and improve return to prior level of function  Plan: Continue per plan of care        Precautions: none    1:1 with -530  ** only bill 2-3 units  RE 10/1  Manuals 7/25 8/1 8/4 8/15 8/18 8/22 8/25 9/1     STM  paraspinals  Rossy FRAGOSO mobilizations thoracic spine   BERNABE GIII Assessment        BERNABE                   Neuro Re-Ed             TA   10x5"  10x5"         TA + march  x20  20x 20x x20       TA + leg ext  x10   10x        Dead bug       10x ea side       Bird dog              TA + straight arm pull down   RTB 3x10          Bent over row         12# 2x10 B                               Ther Ex             LTR 10x5'' 10x5"   10x 10x x10 x10      SKC 10x5''  3x  3x   3x       Clamshells             glute sets [place] 5''x20 2x10  3x10 3x10 3x10 10x regular, 10x leg ext      Mini squats             Standing SLR             DKC with Pball             Bike  5 min 5 min 5 min  5 min  5'  6'     Lumbar rolls  x10 ea dir  x5 ea dir  5x ea dir 5x ea dir x5 ea dir 5x ea dir  10x ea dir      Open book stretch  x10 10x 10x         Prone press up - thoracic   10x5"           1/2 foam thoracic extensions   5x5" ext + diag           Rows   RTB 3x10          Cross body door stretch    3x30"           Seated thoracic [place] rotation    9# 10x         Hip abd    2x10 3x10 3x10       Hip ext    2x10 3x10 3x10       Treadmill       5'       Seated lumbar flexion folds       10x      Bicep curls         10# 2x10 alt hands                  Ther Activity             [place] walkbacks  15# x10  11# 10x 15# x10 nv  20# 10x     Give HEP NV            Sit to stands   3x10 to chair            Chops   RTB x15 B     9# 10x      Sanjiv walk fwds    8# 10x 13# 10x nv 15# 10x      Sanjiv lateral      7# 10x ea side nv 7# 10x ea side       Suitcase carries     15# 4 laps alt hands  15# x4 laps alt hands 20# x4 laps alt hands  20# x6 lengths      Lifting kinematics        From step with box 4'       Picking up weight from chair to table         12# 6x      Gait Training                                       Modalities

## 2022-09-08 ENCOUNTER — OFFICE VISIT (OUTPATIENT)
Dept: PHYSICAL THERAPY | Facility: CLINIC | Age: 57
End: 2022-09-08
Payer: COMMERCIAL

## 2022-09-08 DIAGNOSIS — M54.50 ACUTE LEFT-SIDED LOW BACK PAIN WITHOUT SCIATICA: Primary | ICD-10-CM

## 2022-09-08 PROCEDURE — 97110 THERAPEUTIC EXERCISES: CPT

## 2022-09-08 PROCEDURE — 97530 THERAPEUTIC ACTIVITIES: CPT

## 2022-09-08 NOTE — PROGRESS NOTES
Daily Note     Today's date: 2022  Patient name: Genna Copeland  : 1965  MRN: [id number]  Referring provider: Sergio Truong  Dx: No diagnosis found  Subjective: Patient reports that her back is feeling good and she hasn't really had any issues  She is still hesitant at work because she can feel some instances that make her nervous       Objective: See treatment diary below      Assessment: Patient was challenged with plank exercises today and required tactile cueing for positioning and to minimize excessive lumbar lordosis  Tolerated exercises well with fatigue by the end of the session and no low back discomfort  Continue to progress full body strengthening due to increased work demands  Plan: Continue per plan of care        Precautions: none    1:1 with -509  ** only bill 2-3 units  RE 10/1  Manuals 7/25 8/1 8/4 8/15 8/18 8/22 8/25 9/1 9/8    STM  paraspinals  Rae Arzola  MS       PA mobilizations thoracic spine   BERNABE GIII           Assessment        BERNABE                   Neuro Re-Ed             TA   10x5"  10x5"         TA + march  x20  20x 20x x20       TA + leg ext  x10   10x        Dead bug       10x ea side   10x    Bird dog              TA + straight arm pull down   RTB 3x10          Bent over row         12# 2x10 B                               Ther Ex             LTR 10x5'' 10x5"   10x 10x x10 x10      SKC 10x5''  3x  3x   3x       Clamshells             glute sets [place] 5''x20 2x10  3x10 3x10 3x10 10x regular, 10x leg ext  SL 3x10    Mini squats             Standing SLR             DKC with Pball             Bike  5 min 5 min 5 min  5 min  5'  6' 6'     Lumbar rolls  x10 ea dir  x5 ea dir  5x ea dir 5x ea dir x5 ea dir 5x ea dir  10x ea dir      Open book stretch  x10 10x 10x         Prone press up - thoracic   10x5"           1/2 foam thoracic extensions   5x5" ext + diag           Rows   RTB 3x10          Cross body door stretch    3x30"           Seated thoracic [place] rotation    9# 10x         Hip abd    2x10 3x10 3x10   sidelying 3x10    Hip ext    2x10 3x10 3x10       Treadmill       5'       Seated lumbar flexion folds       10x      Bicep curls         10# 2x10 alt hands 10# 2x10    Leg press         50# 3x10    Ther Activity             [place] walkbacks  15# x10  11# 10x 15# x10 nv  20# 10x 25# 10x    Give HEP NV            Sit to stands   3x10 to chair            Chops   RTB x15 B     9# 10x      Sanjiv walk fwds    8# 10x 13# 10x nv 15# 10x  20# 10x    Sanj[place] lateral      7# 10x ea side nv 7# 10x ea side       Suitcase carries     15# 4 laps alt hands  15# x4 laps alt hands 20# x4 laps alt hands  20# x6 lengths  20# x6 lengths     Lifting kinematics        From step with box 4'       Picking up weight from chair to table         12# 6x                   TRX squats         2x10    Plank on knees         3x20s    Gait Training                                       Modalities

## 2022-09-14 ENCOUNTER — OFFICE VISIT (OUTPATIENT)
Dept: PHYSICAL THERAPY | Facility: CLINIC | Age: 57
End: 2022-09-14
Payer: COMMERCIAL

## 2022-09-14 DIAGNOSIS — M54.50 ACUTE LEFT-SIDED LOW BACK PAIN WITHOUT SCIATICA: Primary | ICD-10-CM

## 2022-09-14 PROCEDURE — 97530 THERAPEUTIC ACTIVITIES: CPT

## 2022-09-14 PROCEDURE — 97110 THERAPEUTIC EXERCISES: CPT

## 2022-09-14 NOTE — PROGRESS NOTES
Daily Note     Today's date: 2022  Patient name: Venus Dennis  : 1965  MRN: [id number]  Referring provider: Yury Schwab  Dx:   Encounter Diagnosis     ICD-10-CM    1  Acute left-sided low back pain without sciatica  M54 49                   Subjective: Patient reports feeling tired today but her back is feeling good  Ready for discharge next visit  Objective: See treatment diary below      Assessment: Patient requested to end session early today due to general fatigue  Tolerated exercises well and felt challenged by plank bird dogs  Plan for discharge next visit  Plan: Continue per plan of care        Precautions: none    1:1 with PT 4-430  ** only bill 2-3 units  RE 10/1  Manuals 7/25 8/1 8/4 8/15 8/18 8/22 8/25 9/1 9/8 9/14   STM  [employer]  Mac Salvador  MS       PA mobilizations thoracic spine   BERNABE GIII           Assessment        BERNABE                   Neuro Re-Ed             TA   10x5"  10x5"         TA + march  x20  20x 20x x20       TA + leg ext  x10   10x        Dead bug       10x ea side   10x    Bird dog           Modified plank position 10x   TA + straight arm pull down   RTB 3x10          Bent over row         12# 2x10 B                               Ther Ex             LTR 10x5'' 10x5"   10x 10x x10 x10      SKC 10x5''  3x  3x   3x       Clamshells             glute sets Bridges 5''x20 2x10  3x10 3x10 3x10 10x regular, 10x leg ext  SL 3x10    Mini squats             Standing SLR             DKC with Pball             Bike  5 min 5 min 5 min  5 min  5'  6' 6'  6'    Lumbar rolls  x10 ea dir  x5 ea dir  5x ea dir 5x ea dir x5 ea dir 5x ea dir  10x ea dir      Open book stretch  x10 10x 10x         Prone press up - thoracic   10x5"           1/2 foam thoracic extensions   5x5" ext + diag           Rows   RTB 3x10          Cross body door stretch    3x30"           Seated thoracic [place] rotation    9# 10x         Hip abd    2x10 3x10 3x10   sidelying 3x10    Hip ext    2x10 3x10 3x10 Treadmill       5'       Seated lumbar flexion folds       10x      Bicep curls         10# 2x10 alt hands 10# 2x10    Leg press         50# 3x10    Planks          4x30s                Ther Activity             Vass walkbacks  15# x10  11# 10x 15# x10 nv  20# 10x 25# 10x 25# 10x   Give HEP NV            Sit to stands   3x10 to chair            Chops   RTB x15 B     9# 10x      Sanjiv walk fwds    8# 10x 13# 10x nv 15# 10x  20# 10x 23# 10x   Vass lateral      7# 10x ea side nv 7# 10x ea side       Suitcase carries     15# 4 laps alt hands  15# x4 laps alt hands 20# x4 laps alt hands  20# x6 lengths  20# x6 lengths  20# x6 lengths    Lifting kinematics        From step with box 4'       Picking up weight from chair to table         12# 6x                   TRX squats         2x10    Plank on knees         3x20s    Circuit:           20# KB squats, suitcase carries x3                             Gait Training                                       Modalities

## 2022-09-21 ENCOUNTER — OFFICE VISIT (OUTPATIENT)
Dept: PHYSICAL THERAPY | Facility: CLINIC | Age: 57
End: 2022-09-21
Payer: COMMERCIAL

## 2022-09-21 DIAGNOSIS — M54.50 ACUTE LEFT-SIDED LOW BACK PAIN WITHOUT SCIATICA: Primary | ICD-10-CM

## 2022-09-21 PROCEDURE — 97110 THERAPEUTIC EXERCISES: CPT

## 2022-09-21 NOTE — PROGRESS NOTES
PT Discharge    Today's date: 2022  Patient name: Chely De Los Santos  : 1965  MRN: [id number]  Referring provider: Sue Fisher  Dx:   Encounter Diagnosis     ICD-10-CM    1  Acute left-sided low back pain without sciatica  M54 49                   Subjective: Patient reports feeling 100% improved and is currently at desired level of function  She has improved confidence in her ability to complete work-related activities  Goals  Pt will demonstrate [place] with progressive home exercise program to improve carryover and decrease recurrence of symptoms  -MET  Pt will demonstrate 20% improvement in FOTO score to increase tolerance to functional activities -MET  Pt will demonstrate 10-20 deg improvement in lumbar AROM to increase tolerance to reaching for items off the floor and improve ease with turning -MET   Pt will demonstrate tolerance to pushing/pulling medium weights (up to 15Ibs) with min to no discomfort to improve ease with return to work duties -MET   Pt will demonstrate tolerance to maintain proper posture (decrease in forward flexion) when amb/standing without sx elevation to improve ease with  Standing/walking during work up to 8 hours   -MET   Pt will demonstrate 5/5 in LE strength to allow for improved tolerance to prolonged amb/standing -MET      Objective:         Concurrent Complaints  Negative for night pain, disturbed sleep, bladder dysfunction, bowel dysfunction and saddle (S4) numbness     Active Range of Motion      Lumbar   Flexion:  WFL  Extension: WFL  Left lateral flexion: WFL  Right lateral flexion: WFL  Left rotation:  Restriction level: minimal  Right rotation:  Restriction level: minimal     Strength/Myotome Testing      Lumbar   Left   Heel walk: normal  Toe walk: normal     Right   Heel walk: normal  Toe walk: normal     Left Hip   Planes of Motion   Flexion: 5  External rotation:   Internal rotation: 5     Right Hip   Planes of Motion   Flexion:   External rotation: Internal rotation: 5     Left Knee   Flexion: 5  Extension: 5     Right Knee   Flexion: 5  Extension: 5     Left Ankle/Foot   Dorsiflexion: 5  Plantar flexion: 5  Great toe extension: 5     Right Ankle/Foot   Dorsiflexion: 5  Plantar flexion: 5  Great toe extension: 5     Tests      Lumbar      Left   Negative crossed SLR, passive SLR and slump test       Right   Negative crossed SLR, passive SLR and slump test       General Comments:     Lower quarter screen   Hips: unremarkable  Knees: unremarkable  Foot/ankle: unremarkable        Assessment: 9/21: Patient reports a perceived improvement of 100%  She is able to perform all work-related activities without pain and feels 100% confidence in her ability to perform all desired activities without pain  Pt to be d/c to HEP at this time  9/1Etitus Kevin reports a perceived improvement of 90%  Functional status measure improved from 43 to 87  Patient notes pain has reduced to 0-1/10  Overall, patient has made steady progress toward goals but is still fearful and hesitant of work-related activities  She is required to hold 12-15# pieces of equipment at a time to work on it and would like to spend a few PT sessions trying to improve work-related tasks       Initial eval: Pt is 60 yo female presenting to OP PT secondary to diagnosis of Low back pain with onset 1 month ago  Pt denies hx of trauma  Pt reports functional limitations as follows discomfort with pushing/pulling activities, pain elevated when waking up in the morning, and intermittently at the end of her work day  Patient demonstrates lumbar AROM deficits, LE strength deficits, and postural deficits in standing/sitting  Pt presents with signs and symptoms consistent with classification of acute low back pain with mobility deficits  She has discomfort with positions promoting back extension, flexion-based program initially progress to extension based program once sx decrease   Pt would benefit from skilled PT to address aforementioned deficits and improve return to prior level of function          Plan: Discharge     Precautions: none    1:1 with -430  ** only bill 2-3 units  RE 10/1  Manuals 9/21   8/15 8/18 8/22 8/25 9/1 9/8 9/14   STM    BERNABE  MS       PA mobilizations thoracic spine             Assessment BERNABE       BERNABE                   Neuro Re-Ed             TA     10x5"         TA + march    20x 20x x20       TA + leg ext     10x        Dead bug       10x ea side   10x    Bird dog           Modified plank position 10x   TA + straight arm pull down             Bent over row         12# 2x10 B                               Ther Ex             LTR    10x 10x x10 x10      SKC    3x   3x       Clamshells             glute sets Bridges    3x10 3x10 3x10 10x regular, 10x leg ext  SL 3x10    Mini squats             Standing SLR             DKC with Pball             Bike    5 min  5 min  5'  6' 6'  6'    Lumbar rolls    5x ea dir 5x ea dir x5 ea dir 5x ea dir  10x ea dir      Open book stretch    10x         Prone press up - thoracic             1/2 foam thoracic extensions             Rows             Cross body door stretch              Seated thoracic Sanjiv rotation    9# 10x         Hip abd    2x10 3x10 3x10   sidelying 3x10    Hip ext    2x10 3x10 3x10       Treadmill       5'       Seated lumbar flexion folds       10x      Bicep curls         10# 2x10 alt hands 10# 2x10    Leg press         50# 3x10    Planks          4x30s                Ther Activity             Sanjiv walkbacks    11# 10x 15# x10 nv  20# 10x 25# 10x 25# 10x   Give HEP             Sit to stands              Chops       9# 10x      [place] walk fwds    8# 10x 13# 10x nv 15# 10x  20# 10x 23# 10x   [place] lateral      7# 10x ea side nv 7# 10x ea side       Suitcase carries     15# 4 laps alt hands  15# x4 laps alt hands 20# x4 laps alt hands  20# x6 lengths  20# x6 lengths  20# x6 lengths    Lifting kinematics        From step with box 4' Picking up weight from chair to table         12# 6x                   TRX squats         2x10    Plank on knees         3x20s    Circuit:           20# KB squats, suitcase carries x3                             Gait Training                                       Modalities

## 2024-10-24 ENCOUNTER — NEW PATIENT (OUTPATIENT)
Dept: URBAN - METROPOLITAN AREA CLINIC 6 | Facility: CLINIC | Age: 59
End: 2024-10-24

## 2024-10-24 DIAGNOSIS — H40.033: ICD-10-CM

## 2024-10-24 DIAGNOSIS — H01.02B: ICD-10-CM

## 2024-10-24 DIAGNOSIS — H25.13: ICD-10-CM

## 2024-10-24 DIAGNOSIS — H01.02A: ICD-10-CM

## 2024-10-24 DIAGNOSIS — D23.122: ICD-10-CM

## 2024-10-24 PROCEDURE — 92133 CPTRZD OPH DX IMG PST SGM ON: CPT | Mod: NC

## 2024-10-24 PROCEDURE — 92020 GONIOSCOPY: CPT

## 2024-10-24 PROCEDURE — 92004 COMPRE OPH EXAM NEW PT 1/>: CPT

## 2024-10-24 PROCEDURE — 92132 CPTRZD OPH DX IMG ANT SGM: CPT

## 2024-10-24 PROCEDURE — 76514 ECHO EXAM OF EYE THICKNESS: CPT

## 2024-10-24 ASSESSMENT — TONOMETRY
OD_IOP_MMHG: 21
OS_IOP_MMHG: 15
OS_IOP_MMHG: 21
OD_IOP_MMHG: 16

## 2024-10-24 ASSESSMENT — VISUAL ACUITY
OS_CC: 20/20
OD_CC: 20/30+2